# Patient Record
(demographics unavailable — no encounter records)

---

## 2025-07-04 NOTE — HEALTH RISK ASSESSMENT
[Fair] : ~his/her~ current health as fair  [No falls in past year] : Patient reported no falls in the past year [de-identified] : walking [de-identified] : rice, grains, meat, beans, vegetables, fruits [Yes] : Reviewed medication list for presence of high-risk medications. [Benzodiazepines] : benzodiazepines [Opioids] : opioids [Blood Thinners] : blood thinners [Muscle Relaxants] : muscle relaxants [Sedatives] : sedatives [Never] : Never [NO] : No [Patient declined colonoscopy] : Patient declined colonoscopy [Language] : denies difficulty with language [Behavior] : denies difficulty with behavior [Learning/Retaining New Information] : denies difficulty learning/retaining new information [Handling Complex Tasks] : denies difficulty handling complex tasks [Reasoning] : denies difficulty with reasoning [Spatial Ability and Orientation] : denies difficulty with spatial ability and orientation [Fully functional (bathing, dressing, toileting, transferring, walking, feeding)] : Fully functional (bathing, dressing, toileting, transferring, walking, feeding) [Some assistance needed] : managing medications [Full assistance needed] : managing finances [Reports changes in hearing] : Reports no changes in hearing [Reports changes in vision] : Reports changes in vision [Reports changes in dental health] : Reports no changes in dental health [Patient/Caregiver not ready to engage] : , patient/caregiver not ready to engage [AdvancecareDate] : 7/25

## 2025-07-04 NOTE — HISTORY OF PRESENT ILLNESS
[FreeTextEntry1] : annual [de-identified] : 91 yo M with CKD, HTN presents for annual.  BPs at home 130s-140s/80s. Gets anxious in the office. Taking amlodipine 5mg (leg swelling at 10mg), losartan 100mg and chlorthalidone 25mg.   Pt reports nocturia 3-4x/night. Basically stops drinking water at 2pm. Drinks 2.5 bottles of water a day. Takes all meds in the morning. Denies hesitancy or urgency/frequency. During day, urinates every 4-5 hrs. At night, every 2-2.5 hrs somehow. Prefers to not take any meds. Does not impact sleep and daily activities.   rash on face-- just noticed past few days. Not itchy.   poor vision-- has macular degeneration and cataracts. One eye stable, one eye getting treatment. Seeing retina specialist. Needs general opthalmologist.

## 2025-07-04 NOTE — PHYSICAL EXAM
[Normal] : affect was normal and insight and judgment were intact [de-identified] : erythema b/l nasolabial folds and nasal bridge